# Patient Record
Sex: MALE | Race: WHITE | URBAN - METROPOLITAN AREA
[De-identification: names, ages, dates, MRNs, and addresses within clinical notes are randomized per-mention and may not be internally consistent; named-entity substitution may affect disease eponyms.]

---

## 2023-06-05 ENCOUNTER — OFFICE VISIT (OUTPATIENT)
Dept: URBAN - METROPOLITAN AREA CLINIC 16 | Facility: CLINIC | Age: 68
End: 2023-06-05
Payer: COMMERCIAL

## 2023-06-05 DIAGNOSIS — S05.02XA CORNEAL ABRASION OF LEFT EYE, INITIAL ENCOUNTER: Primary | ICD-10-CM

## 2023-06-05 PROCEDURE — 99203 OFFICE O/P NEW LOW 30 MIN: CPT | Performed by: OPTOMETRIST

## 2023-06-05 RX ORDER — OFLOXACIN 3 MG/ML
0.3 % SOLUTION/ DROPS OPHTHALMIC
Qty: 5 | Refills: 1 | Status: ACTIVE
Start: 2023-06-05

## 2023-06-05 NOTE — IMPRESSION/PLAN
Impression: Corneal abrasion of left eye, initial encounter: S05. 02XA. Plan: Large Abrasion OS. Emycin instilled in office, bandage CL placed. Begin ofloxacin 6x/day OS, do not rub eye or remove CL. RTC 1 week x follow up, sooner if vision worsens or pain worsens. d/c tetracaine drops.

## 2023-06-06 ENCOUNTER — OFFICE VISIT (OUTPATIENT)
Dept: URBAN - METROPOLITAN AREA CLINIC 16 | Facility: CLINIC | Age: 68
End: 2023-06-06
Payer: COMMERCIAL

## 2023-06-06 DIAGNOSIS — S05.02XD CORNEAL ABRASION OF LEFT EYE, SUBSEQUENT ENCOUNTER: Primary | ICD-10-CM

## 2023-06-06 PROCEDURE — 99213 OFFICE O/P EST LOW 20 MIN: CPT | Performed by: OPTOMETRIST

## 2023-06-06 NOTE — IMPRESSION/PLAN
Impression: Corneal abrasion of left eye, subsequent encounter: S05. 02XD. Plan: BCL still in place. Instilled 1 drop of Ofloxacin in clinic today. Use Ofloxacin 6x a day OS. RTC as scheduled, Thursday or Friday if BCL is still bothersome.

## 2023-06-12 ENCOUNTER — OFFICE VISIT (OUTPATIENT)
Dept: URBAN - METROPOLITAN AREA CLINIC 16 | Facility: CLINIC | Age: 68
End: 2023-06-12
Payer: COMMERCIAL

## 2023-06-12 PROCEDURE — 99212 OFFICE O/P EST SF 10 MIN: CPT | Performed by: OPTOMETRIST

## 2023-06-12 NOTE — IMPRESSION/PLAN
Impression: Corneal abrasion of left eye, initial encounter: S05. 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

## 2023-06-16 ENCOUNTER — OFFICE VISIT (OUTPATIENT)
Dept: URBAN - METROPOLITAN AREA CLINIC 16 | Facility: CLINIC | Age: 68
End: 2023-06-16
Payer: COMMERCIAL

## 2023-06-16 DIAGNOSIS — S05.02XA CORNEAL ABRASION OF LEFT EYE, INITIAL ENCOUNTER: Primary | ICD-10-CM

## 2023-06-16 PROCEDURE — 99203 OFFICE O/P NEW LOW 30 MIN: CPT | Performed by: OPHTHALMOLOGY

## 2023-06-16 NOTE — IMPRESSION/PLAN
Impression: Corneal abrasion of left eye, initial encounter: S05.02xA. Condition: self limited, minor problem.  Plan: removed BCL, use tears freq and ofloxacin tid x 2d